# Patient Record
Sex: FEMALE | Race: AMERICAN INDIAN OR ALASKA NATIVE | URBAN - METROPOLITAN AREA
[De-identification: names, ages, dates, MRNs, and addresses within clinical notes are randomized per-mention and may not be internally consistent; named-entity substitution may affect disease eponyms.]

---

## 2018-05-09 ENCOUNTER — OUTPATIENT (OUTPATIENT)
Dept: OUTPATIENT SERVICES | Facility: HOSPITAL | Age: 68
LOS: 1 days | Discharge: HOME | End: 2018-05-09

## 2018-05-09 DIAGNOSIS — Z12.31 ENCOUNTER FOR SCREENING MAMMOGRAM FOR MALIGNANT NEOPLASM OF BREAST: ICD-10-CM

## 2018-05-10 DIAGNOSIS — Z78.0 ASYMPTOMATIC MENOPAUSAL STATE: ICD-10-CM

## 2018-05-10 DIAGNOSIS — Z13.820 ENCOUNTER FOR SCREENING FOR OSTEOPOROSIS: ICD-10-CM

## 2018-05-10 DIAGNOSIS — M81.0 AGE-RELATED OSTEOPOROSIS WITHOUT CURRENT PATHOLOGICAL FRACTURE: ICD-10-CM

## 2019-01-11 ENCOUNTER — EMERGENCY (EMERGENCY)
Facility: HOSPITAL | Age: 69
LOS: 0 days | Discharge: HOME | End: 2019-01-11
Attending: STUDENT IN AN ORGANIZED HEALTH CARE EDUCATION/TRAINING PROGRAM | Admitting: STUDENT IN AN ORGANIZED HEALTH CARE EDUCATION/TRAINING PROGRAM

## 2019-01-11 VITALS
HEIGHT: 64 IN | RESPIRATION RATE: 18 BRPM | OXYGEN SATURATION: 99 % | TEMPERATURE: 100 F | HEART RATE: 109 BPM | DIASTOLIC BLOOD PRESSURE: 59 MMHG | SYSTOLIC BLOOD PRESSURE: 128 MMHG | WEIGHT: 126.1 LBS

## 2019-01-11 VITALS
OXYGEN SATURATION: 100 % | TEMPERATURE: 99 F | HEART RATE: 89 BPM | SYSTOLIC BLOOD PRESSURE: 125 MMHG | RESPIRATION RATE: 18 BRPM | DIASTOLIC BLOOD PRESSURE: 76 MMHG

## 2019-01-11 DIAGNOSIS — R09.89 OTHER SPECIFIED SYMPTOMS AND SIGNS INVOLVING THE CIRCULATORY AND RESPIRATORY SYSTEMS: ICD-10-CM

## 2019-01-11 DIAGNOSIS — Z79.2 LONG TERM (CURRENT) USE OF ANTIBIOTICS: ICD-10-CM

## 2019-01-11 DIAGNOSIS — J18.9 PNEUMONIA, UNSPECIFIED ORGANISM: ICD-10-CM

## 2019-01-11 LAB
ALBUMIN SERPL ELPH-MCNC: 3.3 G/DL — LOW (ref 3.5–5.2)
ALP SERPL-CCNC: 69 U/L — SIGNIFICANT CHANGE UP (ref 30–115)
ALT FLD-CCNC: 17 U/L — SIGNIFICANT CHANGE UP (ref 0–41)
ANION GAP SERPL CALC-SCNC: 14 MMOL/L — SIGNIFICANT CHANGE UP (ref 7–14)
AST SERPL-CCNC: 20 U/L — SIGNIFICANT CHANGE UP (ref 0–41)
BASE EXCESS BLDV CALC-SCNC: 3.5 MMOL/L — HIGH (ref -2–2)
BASOPHILS # BLD AUTO: 0.02 K/UL — SIGNIFICANT CHANGE UP (ref 0–0.2)
BASOPHILS NFR BLD AUTO: 0.2 % — SIGNIFICANT CHANGE UP (ref 0–1)
BILIRUB SERPL-MCNC: 0.5 MG/DL — SIGNIFICANT CHANGE UP (ref 0.2–1.2)
BUN SERPL-MCNC: 10 MG/DL — SIGNIFICANT CHANGE UP (ref 10–20)
CA-I SERPL-SCNC: 1.2 MMOL/L — SIGNIFICANT CHANGE UP (ref 1.12–1.3)
CALCIUM SERPL-MCNC: 8.5 MG/DL — SIGNIFICANT CHANGE UP (ref 8.5–10.1)
CHLORIDE SERPL-SCNC: 95 MMOL/L — LOW (ref 98–110)
CO2 SERPL-SCNC: 24 MMOL/L — SIGNIFICANT CHANGE UP (ref 17–32)
CREAT SERPL-MCNC: 0.6 MG/DL — LOW (ref 0.7–1.5)
EOSINOPHIL # BLD AUTO: 0.04 K/UL — SIGNIFICANT CHANGE UP (ref 0–0.7)
EOSINOPHIL NFR BLD AUTO: 0.4 % — SIGNIFICANT CHANGE UP (ref 0–8)
GAS PNL BLDV: 135 MMOL/L — LOW (ref 136–145)
GAS PNL BLDV: SIGNIFICANT CHANGE UP
GLUCOSE SERPL-MCNC: 108 MG/DL — HIGH (ref 70–99)
HCO3 BLDV-SCNC: 28 MMOL/L — SIGNIFICANT CHANGE UP (ref 22–29)
HCT VFR BLD CALC: 32.6 % — LOW (ref 37–47)
HCT VFR BLDA CALC: 34.9 % — SIGNIFICANT CHANGE UP (ref 34–44)
HGB BLD CALC-MCNC: 11.4 G/DL — LOW (ref 14–18)
HGB BLD-MCNC: 10.8 G/DL — LOW (ref 12–16)
IMM GRANULOCYTES NFR BLD AUTO: 0.4 % — HIGH (ref 0.1–0.3)
LACTATE BLDV-MCNC: 0.8 MMOL/L — SIGNIFICANT CHANGE UP (ref 0.5–1.6)
LYMPHOCYTES # BLD AUTO: 1.03 K/UL — LOW (ref 1.2–3.4)
LYMPHOCYTES # BLD AUTO: 10.2 % — LOW (ref 20.5–51.1)
MCHC RBC-ENTMCNC: 28.7 PG — SIGNIFICANT CHANGE UP (ref 27–31)
MCHC RBC-ENTMCNC: 33.1 G/DL — SIGNIFICANT CHANGE UP (ref 32–37)
MCV RBC AUTO: 86.7 FL — SIGNIFICANT CHANGE UP (ref 81–99)
MONOCYTES # BLD AUTO: 0.67 K/UL — HIGH (ref 0.1–0.6)
MONOCYTES NFR BLD AUTO: 6.6 % — SIGNIFICANT CHANGE UP (ref 1.7–9.3)
NEUTROPHILS # BLD AUTO: 8.28 K/UL — HIGH (ref 1.4–6.5)
NEUTROPHILS NFR BLD AUTO: 82.2 % — HIGH (ref 42.2–75.2)
NRBC # BLD: 0 /100 WBCS — SIGNIFICANT CHANGE UP (ref 0–0)
PCO2 BLDV: 40 MMHG — LOW (ref 41–51)
PH BLDV: 7.45 — HIGH (ref 7.26–7.43)
PLATELET # BLD AUTO: 166 K/UL — SIGNIFICANT CHANGE UP (ref 130–400)
PO2 BLDV: 31 MMHG — SIGNIFICANT CHANGE UP (ref 20–40)
POTASSIUM BLDV-SCNC: 3.8 MMOL/L — SIGNIFICANT CHANGE UP (ref 3.3–5.6)
POTASSIUM SERPL-MCNC: 3.7 MMOL/L — SIGNIFICANT CHANGE UP (ref 3.5–5)
POTASSIUM SERPL-SCNC: 3.7 MMOL/L — SIGNIFICANT CHANGE UP (ref 3.5–5)
PROT SERPL-MCNC: 8.3 G/DL — HIGH (ref 6–8)
RBC # BLD: 3.76 M/UL — LOW (ref 4.2–5.4)
RBC # FLD: 13.5 % — SIGNIFICANT CHANGE UP (ref 11.5–14.5)
SAO2 % BLDV: 62 % — SIGNIFICANT CHANGE UP
SODIUM SERPL-SCNC: 133 MMOL/L — LOW (ref 135–146)
WBC # BLD: 10.08 K/UL — SIGNIFICANT CHANGE UP (ref 4.8–10.8)
WBC # FLD AUTO: 10.08 K/UL — SIGNIFICANT CHANGE UP (ref 4.8–10.8)

## 2019-01-11 NOTE — ED PROVIDER NOTE - NS ED ROS FT
Constitutional: No chills. Normal appetite. No unintended weight loss.   Eyes: No vision changes.  ENT: No hearing changes. No ear pain. No sore throat.  Neck: No neck pain or stiffness.  Cardiovascular: No chest pain, palpitations, or edema.  Pulmonary: No cough or SOB. No hemoptysis.  Abdominal: No abdominal pain, nausea, vomiting, or diarrhea.   : No dysuria or frequency. No hematuria.   Neuro: No headache, syncope, or dizziness.  MS: No back pain. No calf pain/swelling.  Psych: No suicidal or homicidal ideations.

## 2019-01-11 NOTE — ED ADULT NURSE NOTE - NSIMPLEMENTINTERV_GEN_ALL_ED
Implemented All Universal Safety Interventions:  [place] to call system. Call bell, personal items and telephone within reach. Instruct patient to call for assistance. Room bathroom lighting operational. Non-slip footwear when patient is off stretcher. Physically safe environment: no spills, clutter or unnecessary equipment. Stretcher in lowest position, wheels locked, appropriate side rails in place.

## 2019-01-11 NOTE — ED PROVIDER NOTE - PHYSICAL EXAMINATION
Constitutional: Well developed, well nourished. NAD. Good general hygiene  Head: Atraumatic.  Eyes: EOMI without discomfort.   ENT: No nasal discharge. Mucous membranes moist.  Neck: Supple. Painless ROM.  Cardiovascular: Regular rhythm. Regular rate. Normal S1 and S2. No murmurs. 2+ pulses in all extremities.   Pulmonary: Normal respiratory rate and effort. Bilateral rales and rhonchi in the lower lung fields. Bilateral, equal lung expansion.   Abdominal: Soft. Nondistended. Nontender. No rebound or guarding.   Extremities: No lower extremity edema. Symmetric calves.  Skin: No rashes.   Neuro: AAOx3. No focal neurological deficits.  Psych: Normal mood. Normal affect.

## 2019-01-11 NOTE — ED PROVIDER NOTE - OBJECTIVE STATEMENT
69y F wo medical problems presents for evaluation at the request of her OBGYN who heard rales and rhonchi on lung exam, so sent for CXR as outpatient. xray was read as b/l pna, so pt was sent to ED for evaluation. Pt has had 4 days of fever with minimal cough. No sick contacts. No recent travel. No CP. No SOB.

## 2019-01-11 NOTE — ED PROVIDER NOTE - ATTENDING CONTRIBUTION TO CARE
70 yo f no pmh here for eval for possible pna. pt saw her obgyn today who heard rales on exam so she sent pt for xr. xr read as b/l pna so obgyn sent pt for ed for eval. pt endorsing 4 days of fever w/ minimal cough. no recent travel/cp/sob.    vss  gen- NAD, aaox3  card-rrr  lungs-mild rhonchi b/l mid lung fields, no resp distress  abd-sntnd, no guarding or rebound  neuro- full str/sensation, cn ii-xii grossly intact, normal coordination and gait    well appearing pt w/ fever and xr showing pna  will get basic labs, vbg  if no significantly elevated WBC, lactate, CO2 or hypoxemia in ER, will dc for outpt management of pna.

## 2019-01-11 NOTE — ED PROVIDER NOTE - CARE PROVIDER_API CALL
Maria D Case), Geriatric Medicine; Internal Medicine  [address] [place] [address]  Phone: [phone]  Fax: [phone]

## 2019-01-11 NOTE — ED PROVIDER NOTE - PROGRESS NOTE DETAILS
Discussed case with Dr. Montalvo. She already sent the patient azithromycin. Will add augmentin. Pt will follow with PCP early next week.

## 2019-01-11 NOTE — ED PROVIDER NOTE - MEDICAL DECISION MAKING DETAILS
well appearing w/ 4 days fever, found to have pna. nontoxic, no significant wbc/lactate. stable for outpt f/u for pna

## 2019-01-11 NOTE — ED PROVIDER NOTE - CARE PROVIDERS DIRECT ADDRESSES
[email or web address]

## 2019-01-11 NOTE — ED ADULT NURSE NOTE - GASTROINTESTINAL ASSESSMENT
WDL

## 2019-01-11 NOTE — ED PROVIDER NOTE - NSFOLLOWUPINSTRUCTIONS_ED_ALL_ED_FT
Please follow up with your primary doctor early this week.     Pneumonia    Pneumonia is an infection of the lungs. Pneumonia may be caused by bacteria, viruses, or funguses. Symptoms include coughing, fever, chest pain when breathing deeply or coughing, shortness of breath, fatigue, or muscle aches. Pneumonia can be diagnosed with a medical history and physical exam, as well as other tests which may include a chest X-ray. If you were prescribed an antibiotic medicine, take it as told by your health care provider and do not stop taking the antibiotic even if you start to feel better. Do not use tobacco products, including cigarettes, chewing tobacco, and e-cigarettes.    SEEK IMMEDIATE MEDICAL CARE IF YOU HAVE ANY OF THE FOLLOWING SYMPTOMS: worsening shortness of breath, worsening chest pain, coughing up blood, change in mental status, lightheadedness/dizziness.

## 2019-02-22 ENCOUNTER — OUTPATIENT (OUTPATIENT)
Dept: OUTPATIENT SERVICES | Facility: HOSPITAL | Age: 69
LOS: 1 days | Discharge: HOME | End: 2019-02-22

## 2019-02-22 DIAGNOSIS — J13 PNEUMONIA DUE TO STREPTOCOCCUS PNEUMONIAE: ICD-10-CM

## 2019-03-12 ENCOUNTER — OUTPATIENT (OUTPATIENT)
Dept: OUTPATIENT SERVICES | Facility: HOSPITAL | Age: 69
LOS: 1 days | Discharge: HOME | End: 2019-03-12

## 2019-03-12 DIAGNOSIS — Q44.6 CYSTIC DISEASE OF LIVER: ICD-10-CM

## 2019-04-17 ENCOUNTER — OUTPATIENT (OUTPATIENT)
Dept: OUTPATIENT SERVICES | Facility: HOSPITAL | Age: 69
LOS: 1 days | Discharge: HOME | End: 2019-04-17
Payer: MEDICARE

## 2019-04-17 DIAGNOSIS — G95.9 DISEASE OF SPINAL CORD, UNSPECIFIED: ICD-10-CM

## 2019-04-17 PROCEDURE — 78320: CPT | Mod: 26

## 2019-04-17 PROCEDURE — 78306 BONE IMAGING WHOLE BODY: CPT | Mod: 26

## 2019-07-09 PROBLEM — Z00.00 ENCOUNTER FOR PREVENTIVE HEALTH EXAMINATION: Status: ACTIVE | Noted: 2019-07-09

## 2019-07-25 ENCOUNTER — APPOINTMENT (OUTPATIENT)
Dept: HEMATOLOGY ONCOLOGY | Facility: CLINIC | Age: 69
End: 2019-07-25
Payer: MEDICARE

## 2019-07-25 ENCOUNTER — LABORATORY RESULT (OUTPATIENT)
Age: 69
End: 2019-07-25

## 2019-07-25 VITALS
BODY MASS INDEX: 21.51 KG/M2 | WEIGHT: 126 LBS | RESPIRATION RATE: 14 BRPM | HEART RATE: 72 BPM | SYSTOLIC BLOOD PRESSURE: 100 MMHG | DIASTOLIC BLOOD PRESSURE: 60 MMHG | TEMPERATURE: 97.1 F | HEIGHT: 64 IN

## 2019-07-25 LAB
HCT VFR BLD CALC: 30.5 %
HGB BLD-MCNC: 9.7 G/DL
MCHC RBC-ENTMCNC: 28.9 PG
MCHC RBC-ENTMCNC: 31.8 G/DL
MCV RBC AUTO: 90.8 FL
PLATELET # BLD AUTO: 234 K/UL
PMV BLD: 9.6 FL
RBC # BLD: 3.36 M/UL
RBC # FLD: 13.9 %
RETICS # AUTO: 0.9 %
RETICS AGGREG/RBC NFR: 30.9 K/UL
WBC # FLD AUTO: 7.16 K/UL

## 2019-07-25 PROCEDURE — 99205 OFFICE O/P NEW HI 60 MIN: CPT

## 2019-07-27 LAB
ALBUMIN SERPL ELPH-MCNC: 2.9 G/DL
ALP BLD-CCNC: 52 U/L
ALT SERPL-CCNC: 15 U/L
ANION GAP SERPL CALC-SCNC: 8 MMOL/L
AST SERPL-CCNC: 19 U/L
BILIRUB SERPL-MCNC: 0.4 MG/DL
BUN SERPL-MCNC: 8 MG/DL
CALCIUM SERPL-MCNC: 8.5 MG/DL
CHLORIDE SERPL-SCNC: 105 MMOL/L
CO2 SERPL-SCNC: 26 MMOL/L
CREAT SERPL-MCNC: 0.7 MG/DL
DEPRECATED KAPPA LC FREE/LAMBDA SER: 3.63 RATIO
FERRITIN SERPL-MCNC: 409 NG/ML
GLUCOSE SERPL-MCNC: 102 MG/DL
IGA SER QL IEP: 157 MG/DL
IGG SER QL IEP: 4277 MG/DL
IGM SER QL IEP: 61 MG/DL
KAPPA LC CSF-MCNC: 2.6 MG/DL
KAPPA LC SERPL-MCNC: 9.43 MG/DL
LDH SERPL-CCNC: 178
POTASSIUM SERPL-SCNC: 4 MMOL/L
PROT SERPL-MCNC: 9.1 G/DL
SODIUM SERPL-SCNC: 139 MMOL/L
VIT B12 SERPL-MCNC: 1232 PG/ML

## 2019-07-29 LAB
ALBUMIN MFR SERPL ELPH: 32.5 %
ALBUMIN SERPL-MCNC: 2.9 G/DL
ALBUMIN/GLOB SERPL: 0.5 RATIO
ALPHA1 GLOB MFR SERPL ELPH: 4.5 %
ALPHA1 GLOB SERPL ELPH-MCNC: 0.4 G/DL
ALPHA2 GLOB MFR SERPL ELPH: 10 %
ALPHA2 GLOB SERPL ELPH-MCNC: 0.9 G/DL
B-GLOBULIN MFR SERPL ELPH: 8.3 %
B-GLOBULIN SERPL ELPH-MCNC: 0.7 G/DL
GAMMA GLOB FLD ELPH-MCNC: 4 G/DL
GAMMA GLOB MFR SERPL ELPH: 44.7 %
INTERPRETATION SERPL IEP-IMP: NORMAL
M PROTEIN MFR SERPL ELPH: 37.9 %
M PROTEIN SPEC IFE-MCNC: NORMAL
MONOCLON BAND OBS SERPL: 3.4 G/DL
PROT SERPL-MCNC: 8.9 G/DL
PROT SERPL-MCNC: 8.9 G/DL

## 2019-07-29 NOTE — ASSESSMENT
[FreeTextEntry1] : 69 year old female chronic Hep B carrier , hepatic cysts? \par Progressive normocytic anemia , leukopenia ? , reversed albumin/globulin ratio . liver disease less likely . \par Today's Hb : 9.6 , wbc is normal ( probably secondary to URTI +/- intranasal steroids. \par will recommend work up to rule out mainly multiple myeloma or other lymphoproliferative disorders . \par Further work up may include bone marrow biopsy . Plan was discussed with the patient and spouse who voiced their full agreement and understanding .

## 2019-07-29 NOTE — HISTORY OF PRESENT ILLNESS
[de-identified] : 69 year old  american female referred by Dr Roxanna Cuevas for abnormal hemogram with mild anemia and leukopenia                                                                                              - Octorber 2018 , wbc : 3.5 Hb: 11.6 plat : 166 MCV : 89                                                                                                                                                                                                    --in Feb 2019 Hb: 11.5 , wbc : 2.6 neutrophils : 1045 , lymph : 1139 ferritin : 136 B12 : 1043                                                                                                                                              -  in June 2019 Hb : 11 , wbc : 3.1  Chemistry notable for elevated globulins ( 4.9 ) albumin : 3.2 calcium : 8.6 Normal renal fuction , bilirubin and liver transaminases . In January 2019 she was treated as outpatient for bilateral pneumonia and was since found with benign hepatic cysts by MRI and positive for Hepatitis B SAg . NO known history of hepatitis in the past . No cbc available prior to 2018 \[facility] review : She reports mild cough , postnasal drip and low grade fever for few days , started on nasal spray ( steroids ) \par                           She denies weight loss , night sweats , bone pains , itching , joint pain , photosensitivity , raynaud . no hematochezia . \[facility] Has annual mammogram , last colonoscopy 1 to 2 years ago .

## 2019-07-30 LAB
ALBUPE: 7.1 %
ALPHA1UPE: 41.3 %
ALPHA2UPE: 19.9 %
BETAUPE: 13.8 %
GAMMAUPE: 17.9 %
IGA 24H UR QL IFE: NORMAL
KAPPA LC 24H UR QL: PRESENT
M SPIKE RU: 8.1 %
PROT PATTERN 24H UR ELPH-IMP: NORMAL
PROT UR-MCNC: 8 MG/DL
PROT UR-MCNC: 8 MG/DL

## 2019-08-01 ENCOUNTER — FORM ENCOUNTER (OUTPATIENT)
Age: 69
End: 2019-08-01

## 2019-08-02 ENCOUNTER — LABORATORY RESULT (OUTPATIENT)
Age: 69
End: 2019-08-02

## 2019-08-02 ENCOUNTER — OUTPATIENT (OUTPATIENT)
Dept: OUTPATIENT SERVICES | Facility: HOSPITAL | Age: 69
LOS: 1 days | Discharge: HOME | End: 2019-08-02
Payer: MEDICARE

## 2019-08-02 ENCOUNTER — RESULT REVIEW (OUTPATIENT)
Age: 69
End: 2019-08-02

## 2019-08-02 ENCOUNTER — RX RENEWAL (OUTPATIENT)
Age: 69
End: 2019-08-02

## 2019-08-02 ENCOUNTER — APPOINTMENT (OUTPATIENT)
Dept: HEMATOLOGY ONCOLOGY | Facility: CLINIC | Age: 69
End: 2019-08-02
Payer: MEDICARE

## 2019-08-02 VITALS
DIASTOLIC BLOOD PRESSURE: 56 MMHG | HEART RATE: 91 BPM | TEMPERATURE: 98.7 F | WEIGHT: 126 LBS | SYSTOLIC BLOOD PRESSURE: 109 MMHG | HEIGHT: 64 IN | BODY MASS INDEX: 21.51 KG/M2 | RESPIRATION RATE: 14 BRPM

## 2019-08-02 DIAGNOSIS — D64.9 ANEMIA, UNSPECIFIED: ICD-10-CM

## 2019-08-02 LAB
HCT VFR BLD CALC: 29.7 %
HGB BLD-MCNC: 9.5 G/DL
MCHC RBC-ENTMCNC: 28.9 PG
MCHC RBC-ENTMCNC: 32 G/DL
MCV RBC AUTO: 90.3 FL
PLATELET # BLD AUTO: 248 K/UL
PMV BLD: 9.3 FL
RBC # BLD: 3.29 M/UL
RBC # FLD: 14 %
WBC # FLD AUTO: 8.59 K/UL

## 2019-08-02 PROCEDURE — 38220 DX BONE MARROW ASPIRATIONS: CPT | Mod: 59

## 2019-08-02 PROCEDURE — 88342 IMHCHEM/IMCYTCHM 1ST ANTB: CPT | Mod: 26,59

## 2019-08-02 PROCEDURE — 88305 TISSUE EXAM BY PATHOLOGIST: CPT | Mod: 26

## 2019-08-02 PROCEDURE — 77075 RADEX OSSEOUS SURVEY COMPL: CPT | Mod: 26

## 2019-08-02 PROCEDURE — 88311 DECALCIFY TISSUE: CPT | Mod: 26

## 2019-08-02 PROCEDURE — 88364 INSITU HYBRIDIZATION (FISH): CPT | Mod: 26

## 2019-08-02 PROCEDURE — 38221 DX BONE MARROW BIOPSIES: CPT

## 2019-08-02 PROCEDURE — 88365 INSITU HYBRIDIZATION (FISH): CPT | Mod: 26,59

## 2019-08-02 PROCEDURE — 88313 SPECIAL STAINS GROUP 2: CPT | Mod: 26

## 2019-08-02 PROCEDURE — 99214 OFFICE O/P EST MOD 30 MIN: CPT

## 2019-08-02 PROCEDURE — 71046 X-RAY EXAM CHEST 2 VIEWS: CPT | Mod: 26,59

## 2019-08-02 PROCEDURE — 88341 IMHCHEM/IMCYTCHM EA ADD ANTB: CPT | Mod: 26

## 2019-08-02 PROCEDURE — 85097 BONE MARROW INTERPRETATION: CPT

## 2019-08-02 RX ORDER — LEVOFLOXACIN 750 MG/1
750 TABLET, FILM COATED ORAL DAILY
Qty: 10 | Refills: 0 | Status: COMPLETED | COMMUNITY
Start: 2019-08-02 | End: 2019-08-12

## 2019-08-02 RX ORDER — GUAIFENESIN AND CODEINE PHOSPHATE 10; 100 MG/5ML; MG/5ML
100-10 SOLUTION ORAL
Qty: 1 | Refills: 0 | Status: ACTIVE | COMMUNITY
Start: 2019-08-02 | End: 1900-01-01

## 2019-08-04 LAB — B2 MICROGLOB SERPL-MCNC: 2.5 MG/L

## 2019-08-04 NOTE — HISTORY OF PRESENT ILLNESS
[de-identified] : 69 year old  american female referred by Dr Roxanna Cuevas for abnormal hemogram with mild anemia and leukopenia                                                                                              - Octorber 2018 , wbc : 3.5 Hb: 11.6 plat : 166 MCV : 89                                                                                                                                                                                                    --in Feb 2019 Hb: 11.5 , wbc : 2.6 neutrophils : 1045 , lymph : 1139 ferritin : 136 B12 : 1043                                                                                                                                              -  in June 2019 Hb : 11 , wbc : 3.1  Chemistry notable for elevated globulins ( 4.9 ) albumin : 3.2 calcium : 8.6 Normal renal fuction , bilirubin and liver transaminases . In January 2019 she was treated as outpatient for bilateral pneumonia and was since found with benign hepatic cysts by MRI and positive for Hepatitis B SAg . NO known history of hepatitis in the past . No cbc available prior to 2018 \[facility] review : She reports mild cough , postnasal drip and low grade fever for few days , started on nasal spray ( steroids ) \par                           She denies weight loss , night sweats , bone pains , itching , joint pain , photosensitivity , raynaud . no hematochezia . \[facility] Has annual mammogram , last colonoscopy 1 to 2 years ago .  [de-identified] : 08/02/2019 Patient returns for follow up , work up is  suggestive of IgG kappa myeloma , igG>4000 . mild anemia, normal calcium and renal function , urine with minimal  bence marin proteinuria . She denies bony pain but reports worsening cough for > 1 week , slightly productive and associated with low grade fever . she denies SOB , chills or chest pain . She is currently only on nasal steroids.

## 2019-08-04 NOTE — PHYSICAL EXAM
[Normal] : full range of motion and no deformities appreciated [de-identified] : well developed pleasant female with constant cough .  [de-identified] : no organomegaly  [de-identified] : no wheezes or crackles.  [de-identified] : no tenderness to percussion .

## 2019-08-04 NOTE — ASSESSMENT
[FreeTextEntry1] : 69 year old female with history of pneumonia in January 2019 , suspected  IgG (k) myeloma with anemia, leukopenia ( wbc normalized  probably secondary to infection ) . no other CRAB manifestations . LDH normal . albumin : 2.8 . No evidence of amyloidosis . \par Bone marrow done today to confirm the diagnosis , additional work up with beta 2 MG , skeletal survey , PET scan , may also  require MRI . \par A long discussion ensued with regard to the diagnosis , prognosis ( standard v/s high risk ) ,treatment of myeloma consisting in most cases of induction with triplets followed by stem cell harvest (and  immediate v/s delayed transplant )  , maintenance  therapy . \par \par 2) worsening cough , low grade fever , no SOB . chest xray shows left lingular infiltrates .\par will start on levaquin 750 mg daily , robitussin with codeine . if not improved in 48 hours consider admission for IV antibiotics. May need CT chest at some point .\par

## 2019-08-05 DIAGNOSIS — Z02.9 ENCOUNTER FOR ADMINISTRATIVE EXAMINATIONS, UNSPECIFIED: ICD-10-CM

## 2019-08-07 ENCOUNTER — FORM ENCOUNTER (OUTPATIENT)
Age: 69
End: 2019-08-07

## 2019-08-08 ENCOUNTER — OUTPATIENT (OUTPATIENT)
Dept: OUTPATIENT SERVICES | Facility: HOSPITAL | Age: 69
LOS: 1 days | Discharge: HOME | End: 2019-08-08
Payer: MEDICARE

## 2019-08-08 DIAGNOSIS — C90.00 MULTIPLE MYELOMA NOT HAVING ACHIEVED REMISSION: ICD-10-CM

## 2019-08-08 LAB
GLUCOSE BLDC GLUCOMTR-MCNC: 73 MG/DL — SIGNIFICANT CHANGE UP (ref 70–99)
HEMATOPATHOLOGY REPORT: SIGNIFICANT CHANGE UP

## 2019-08-08 PROCEDURE — 78816 PET IMAGE W/CT FULL BODY: CPT | Mod: 26,PI

## 2019-08-14 ENCOUNTER — OTHER (OUTPATIENT)
Age: 69
End: 2019-08-14

## 2019-08-20 ENCOUNTER — FORM ENCOUNTER (OUTPATIENT)
Age: 69
End: 2019-08-20

## 2019-08-21 ENCOUNTER — OUTPATIENT (OUTPATIENT)
Dept: OUTPATIENT SERVICES | Facility: HOSPITAL | Age: 69
LOS: 1 days | Discharge: HOME | End: 2019-08-21
Payer: MEDICARE

## 2019-08-21 DIAGNOSIS — C90.00 MULTIPLE MYELOMA NOT HAVING ACHIEVED REMISSION: ICD-10-CM

## 2019-08-21 PROCEDURE — 72141 MRI NECK SPINE W/O DYE: CPT | Mod: 26

## 2019-08-21 PROCEDURE — 72146 MRI CHEST SPINE W/O DYE: CPT | Mod: 26

## 2019-08-22 ENCOUNTER — FORM ENCOUNTER (OUTPATIENT)
Age: 69
End: 2019-08-22

## 2019-08-23 ENCOUNTER — OUTPATIENT (OUTPATIENT)
Dept: OUTPATIENT SERVICES | Facility: HOSPITAL | Age: 69
LOS: 1 days | Discharge: HOME | End: 2019-08-23
Payer: MEDICARE

## 2019-08-23 DIAGNOSIS — R10.2 PELVIC AND PERINEAL PAIN: ICD-10-CM

## 2019-08-23 DIAGNOSIS — C90.00 MULTIPLE MYELOMA NOT HAVING ACHIEVED REMISSION: ICD-10-CM

## 2019-08-23 DIAGNOSIS — M54.5 LOW BACK PAIN: ICD-10-CM

## 2019-08-23 PROCEDURE — 72195 MRI PELVIS W/O DYE: CPT | Mod: 26

## 2019-08-23 PROCEDURE — 72148 MRI LUMBAR SPINE W/O DYE: CPT | Mod: 26

## 2019-09-10 ENCOUNTER — OUTPATIENT (OUTPATIENT)
Dept: OUTPATIENT SERVICES | Facility: HOSPITAL | Age: 69
LOS: 1 days | Discharge: HOME | End: 2019-09-10
Payer: MEDICARE

## 2019-09-10 ENCOUNTER — LABORATORY RESULT (OUTPATIENT)
Age: 69
End: 2019-09-10

## 2019-09-10 ENCOUNTER — APPOINTMENT (OUTPATIENT)
Dept: HEMATOLOGY ONCOLOGY | Facility: CLINIC | Age: 69
End: 2019-09-10
Payer: MEDICARE

## 2019-09-10 VITALS
TEMPERATURE: 97.5 F | SYSTOLIC BLOOD PRESSURE: 114 MMHG | HEIGHT: 64 IN | DIASTOLIC BLOOD PRESSURE: 60 MMHG | RESPIRATION RATE: 14 BRPM | WEIGHT: 122 LBS | HEART RATE: 70 BPM | BODY MASS INDEX: 20.83 KG/M2

## 2019-09-10 DIAGNOSIS — D64.9 ANEMIA, UNSPECIFIED: ICD-10-CM

## 2019-09-10 DIAGNOSIS — C90.00 MULTIPLE MYELOMA NOT HAVING ACHIEVED REMISSION: ICD-10-CM

## 2019-09-10 PROCEDURE — 99214 OFFICE O/P EST MOD 30 MIN: CPT

## 2019-09-11 PROBLEM — C90.00 IGG MULTIPLE MYELOMA: Status: ACTIVE | Noted: 2019-08-02

## 2019-09-11 LAB
ALBUMIN SERPL ELPH-MCNC: 3.2 G/DL
ALP BLD-CCNC: 64 U/L
ALT SERPL-CCNC: 14 U/L
ANION GAP SERPL CALC-SCNC: 10 MMOL/L
AST SERPL-CCNC: 20 U/L
B2 MICROGLOB SERPL-MCNC: 2.5 MG/L
BILIRUB SERPL-MCNC: 0.2 MG/DL
BUN SERPL-MCNC: 11 MG/DL
CALCIUM SERPL-MCNC: 8.4 MG/DL
CHLORIDE SERPL-SCNC: 104 MMOL/L
CO2 SERPL-SCNC: 26 MMOL/L
CREAT SERPL-MCNC: 0.6 MG/DL
GLUCOSE SERPL-MCNC: 98 MG/DL
HCT VFR BLD CALC: 29.8 %
HGB BLD-MCNC: 9.7 G/DL
MCHC RBC-ENTMCNC: 29.2 PG
MCHC RBC-ENTMCNC: 32.6 G/DL
MCV RBC AUTO: 89.8 FL
PLATELET # BLD AUTO: 156 K/UL
PMV BLD: 9.3 FL
POTASSIUM SERPL-SCNC: 3.6 MMOL/L
PROT SERPL-MCNC: 9.1 G/DL
RBC # BLD: 3.32 M/UL
RBC # FLD: 16.2 %
SODIUM SERPL-SCNC: 140 MMOL/L
WBC # FLD AUTO: 3.83 K/UL

## 2019-09-11 NOTE — HISTORY OF PRESENT ILLNESS
[de-identified] : 69 year old  american female referred by Dr Roxanna Cuevas for abnormal hemogram with mild anemia and leukopenia                                                                                              - Octorber 2018 , wbc : 3.5 Hb: 11.6 plat : 166 MCV : 89                                                                                                                                                                                                    --in Feb 2019 Hb: 11.5 , wbc : 2.6 neutrophils : 1045 , lymph : 1139 ferritin : 136 B12 : 1043                                                                                                                                              -  in June 2019 Hb : 11 , wbc : 3.1  Chemistry notable for elevated globulins ( 4.9 ) albumin : 3.2 calcium : 8.6 Normal renal fuction , bilirubin and liver transaminases . In January 2019 she was treated as outpatient for bilateral pneumonia and was since found with benign hepatic cysts by MRI and positive for Hepatitis B SAg . NO known history of hepatitis in the past . No cbc available prior to 2018 \[facility] System review : She reports mild cough , postnasal drip and low grade fever for few days , started on nasal spray ( steroids ) \par                           She denies weight loss , night sweats , bone pains , itching , joint pain , photosensitivity , raynaud . no hematochezia . \[facility] Has annual mammogram , last colonoscopy 1 to 2 years ago .  [de-identified] : 08/02/2019 Patient returns for follow up , work up is  suggestive of IgG kappa myeloma , igG>4000 . mild anemia, normal calcium and renal function , urine with minimal  bence marin proteinuria . She denies bony pain but reports worsening cough for > 1 week , slightly productive and associated with low grade fever . she denies SOB , chills or chest pain . She is currently only on nasal steroids. \par \par 09/10/2019 Patient returns for pneumonia and newly diagnosed igG multiple myeloma with  high risk cytogenetics  , anemia , negative MRI and PET . Normal calcium and renal function . beta 2 microglobulin : 2.5  She was seen by pulmonary and recommended bronchodilators and follow up CT scan, She is currently asymptomatic .

## 2019-09-11 NOTE — PHYSICAL EXAM
[Normal] : full range of motion and no deformities appreciated [de-identified] : well developed pleasant female in no acute distress.  [de-identified] : no wheezes or crackles.  [de-identified] : no organomegaly  [de-identified] : no tenderness to percussion .

## 2019-09-11 NOTE — ASSESSMENT
[FreeTextEntry1] :  IgG(K) myeloma , Revised ISS stage 2 ,s/p lobar pneumonia ( pneumococcal ? )  with mild anemia and leukopenia . no evidence of bone disease or amyloidosis. \par The diagnosis , prognosis and treatment was discussed again and was recommended to start on treatment with triple therapy ( VRD ) , to be given with antibiotic prophylaxis including zoster and pneumonia , also consider stem cell harvest . she was encouraged to seek a second opinion in one of the myeloma centers . She  requires complex  dental work soon including implants and will eventually be recommended bone modifying agents ( every 6 months ? ) , also check DEXA scan . All their questions and concerns were addressed in detail .

## 2019-09-12 LAB
ALBUMIN MFR SERPL ELPH: 37.4 %
ALBUMIN SERPL-MCNC: 3.3 G/DL
ALBUMIN/GLOB SERPL: 0.6 RATIO
ALPHA1 GLOB MFR SERPL ELPH: 2.5 %
ALPHA1 GLOB SERPL ELPH-MCNC: 0.2 G/DL
ALPHA2 GLOB MFR SERPL ELPH: 6.6 %
ALPHA2 GLOB SERPL ELPH-MCNC: 0.6 G/DL
B-GLOBULIN MFR SERPL ELPH: 7 %
B-GLOBULIN SERPL ELPH-MCNC: 0.6 G/DL
DEPRECATED KAPPA LC FREE/LAMBDA SER: 4.14 RATIO
GAMMA GLOB FLD ELPH-MCNC: 4.1 G/DL
GAMMA GLOB MFR SERPL ELPH: 46.5 %
INTERPRETATION SERPL IEP-IMP: NORMAL
KAPPA LC CSF-MCNC: 1.12 MG/DL
KAPPA LC SERPL-MCNC: 4.64 MG/DL
M PROTEIN MFR SERPL ELPH: 41.9 %
MONOCLON BAND OBS SERPL: 3.7 G/DL
PROT SERPL-MCNC: 8.9 G/DL
PROT SERPL-MCNC: 8.9 G/DL

## 2019-09-30 ENCOUNTER — OUTPATIENT (OUTPATIENT)
Dept: OUTPATIENT SERVICES | Facility: HOSPITAL | Age: 69
LOS: 1 days | Discharge: HOME | End: 2019-09-30
Payer: MEDICARE

## 2019-09-30 DIAGNOSIS — J47.9 BRONCHIECTASIS, UNCOMPLICATED: ICD-10-CM

## 2019-09-30 PROCEDURE — 71250 CT THORAX DX C-: CPT | Mod: 26

## 2021-04-13 NOTE — ED ADULT TRIAGE NOTE - TEMPERATURE IN FAHRENHEIT (DEGREES F)
99.8 Area H Indication Text: Tumors in this location are included in Area H (eyelids, eyebrows, nose, lips, chin, ear, pre-auricular, post-auricular, temple, genitalia, hands, feet, ankles and areola).  Tissue conservation is critical in these anatomic locations.

## 2021-10-18 ENCOUNTER — OUTPATIENT (OUTPATIENT)
Dept: OUTPATIENT SERVICES | Facility: HOSPITAL | Age: 71
LOS: 1 days | Discharge: HOME | End: 2021-10-18
Payer: MEDICARE

## 2021-10-18 DIAGNOSIS — Z12.31 ENCOUNTER FOR SCREENING MAMMOGRAM FOR MALIGNANT NEOPLASM OF BREAST: ICD-10-CM

## 2021-10-18 PROCEDURE — 77067 SCR MAMMO BI INCL CAD: CPT | Mod: 26

## 2021-10-18 PROCEDURE — 77063 BREAST TOMOSYNTHESIS BI: CPT | Mod: 26

## 2021-11-02 ENCOUNTER — OUTPATIENT (OUTPATIENT)
Dept: OUTPATIENT SERVICES | Facility: HOSPITAL | Age: 71
LOS: 1 days | Discharge: HOME | End: 2021-11-02

## 2021-11-03 DIAGNOSIS — Z78.0 ASYMPTOMATIC MENOPAUSAL STATE: ICD-10-CM

## 2021-11-03 DIAGNOSIS — M81.0 AGE-RELATED OSTEOPOROSIS WITHOUT CURRENT PATHOLOGICAL FRACTURE: ICD-10-CM

## 2021-11-03 DIAGNOSIS — Z13.820 ENCOUNTER FOR SCREENING FOR OSTEOPOROSIS: ICD-10-CM

## 2023-01-16 ENCOUNTER — OUTPATIENT (OUTPATIENT)
Dept: OUTPATIENT SERVICES | Facility: HOSPITAL | Age: 73
LOS: 1 days | Discharge: HOME | End: 2023-01-16
Payer: MEDICARE

## 2023-01-16 DIAGNOSIS — Z12.31 ENCOUNTER FOR SCREENING MAMMOGRAM FOR MALIGNANT NEOPLASM OF BREAST: ICD-10-CM

## 2023-01-16 PROCEDURE — 77063 BREAST TOMOSYNTHESIS BI: CPT | Mod: 26

## 2023-01-16 PROCEDURE — 77067 SCR MAMMO BI INCL CAD: CPT | Mod: 26

## 2024-06-08 ENCOUNTER — OUTPATIENT (OUTPATIENT)
Dept: OUTPATIENT SERVICES | Facility: HOSPITAL | Age: 74
LOS: 1 days | End: 2024-06-08
Payer: MEDICARE

## 2024-06-08 DIAGNOSIS — Z00.00 ENCOUNTER FOR GENERAL ADULT MEDICAL EXAMINATION WITHOUT ABNORMAL FINDINGS: ICD-10-CM

## 2024-06-08 DIAGNOSIS — Z12.31 ENCOUNTER FOR SCREENING MAMMOGRAM FOR MALIGNANT NEOPLASM OF BREAST: ICD-10-CM

## 2024-06-08 PROCEDURE — 77067 SCR MAMMO BI INCL CAD: CPT | Mod: 26

## 2024-06-08 PROCEDURE — 77063 BREAST TOMOSYNTHESIS BI: CPT

## 2024-06-08 PROCEDURE — 77063 BREAST TOMOSYNTHESIS BI: CPT | Mod: 26

## 2024-06-08 PROCEDURE — 77067 SCR MAMMO BI INCL CAD: CPT

## 2024-06-09 DIAGNOSIS — Z12.31 ENCOUNTER FOR SCREENING MAMMOGRAM FOR MALIGNANT NEOPLASM OF BREAST: ICD-10-CM

## 2024-06-11 ENCOUNTER — OUTPATIENT (OUTPATIENT)
Dept: OUTPATIENT SERVICES | Facility: HOSPITAL | Age: 74
LOS: 1 days | End: 2024-06-11
Payer: MEDICARE

## 2024-06-11 DIAGNOSIS — C90.00 MULTIPLE MYELOMA NOT HAVING ACHIEVED REMISSION: ICD-10-CM

## 2024-06-11 DIAGNOSIS — Z00.8 ENCOUNTER FOR OTHER GENERAL EXAMINATION: ICD-10-CM

## 2024-06-11 PROCEDURE — 76700 US EXAM ABDOM COMPLETE: CPT

## 2024-06-11 PROCEDURE — 76700 US EXAM ABDOM COMPLETE: CPT | Mod: 26

## 2024-06-12 DIAGNOSIS — C90.00 MULTIPLE MYELOMA NOT HAVING ACHIEVED REMISSION: ICD-10-CM
